# Patient Record
Sex: FEMALE | Race: WHITE | NOT HISPANIC OR LATINO
[De-identification: names, ages, dates, MRNs, and addresses within clinical notes are randomized per-mention and may not be internally consistent; named-entity substitution may affect disease eponyms.]

---

## 2017-08-03 ENCOUNTER — APPOINTMENT (OUTPATIENT)
Dept: SURGICAL ONCOLOGY | Facility: CLINIC | Age: 82
End: 2017-08-03

## 2017-09-21 ENCOUNTER — APPOINTMENT (OUTPATIENT)
Dept: SURGICAL ONCOLOGY | Facility: CLINIC | Age: 82
End: 2017-09-21
Payer: MEDICARE

## 2017-09-21 VITALS
HEIGHT: 60 IN | SYSTOLIC BLOOD PRESSURE: 147 MMHG | OXYGEN SATURATION: 97 % | WEIGHT: 165 LBS | DIASTOLIC BLOOD PRESSURE: 69 MMHG | TEMPERATURE: 98.8 F | HEART RATE: 85 BPM | BODY MASS INDEX: 32.39 KG/M2

## 2017-09-21 DIAGNOSIS — J44.9 CHRONIC OBSTRUCTIVE PULMONARY DISEASE, UNSPECIFIED: ICD-10-CM

## 2017-09-21 DIAGNOSIS — Z87.39 PERSONAL HISTORY OF OTHER DISEASES OF THE MUSCULOSKELETAL SYSTEM AND CONNECTIVE TISSUE: ICD-10-CM

## 2017-09-21 DIAGNOSIS — Z85.9 PERSONAL HISTORY OF MALIGNANT NEOPLASM, UNSPECIFIED: ICD-10-CM

## 2017-09-21 DIAGNOSIS — Z87.891 PERSONAL HISTORY OF NICOTINE DEPENDENCE: ICD-10-CM

## 2017-09-21 DIAGNOSIS — M48.06 SPINAL STENOSIS, LUMBAR REGION: ICD-10-CM

## 2017-09-21 DIAGNOSIS — Z82.61 FAMILY HISTORY OF ARTHRITIS: ICD-10-CM

## 2017-09-21 DIAGNOSIS — Z78.9 OTHER SPECIFIED HEALTH STATUS: ICD-10-CM

## 2017-09-21 DIAGNOSIS — Z87.09 PERSONAL HISTORY OF OTHER DISEASES OF THE RESPIRATORY SYSTEM: ICD-10-CM

## 2017-09-21 DIAGNOSIS — Z82.62 FAMILY HISTORY OF OSTEOPOROSIS: ICD-10-CM

## 2017-09-21 PROCEDURE — 99204 OFFICE O/P NEW MOD 45 MIN: CPT

## 2018-02-12 ENCOUNTER — APPOINTMENT (OUTPATIENT)
Dept: GERIATRICS | Facility: CLINIC | Age: 83
End: 2018-02-12
Payer: MEDICARE

## 2018-02-12 VITALS
HEART RATE: 104 BPM | HEIGHT: 60 IN | BODY MASS INDEX: 26.9 KG/M2 | WEIGHT: 137 LBS | SYSTOLIC BLOOD PRESSURE: 98 MMHG | DIASTOLIC BLOOD PRESSURE: 62 MMHG | OXYGEN SATURATION: 98 %

## 2018-02-12 DIAGNOSIS — Z60.2 PROBLEMS RELATED TO LIVING ALONE: ICD-10-CM

## 2018-02-12 LAB
BASOPHILS # BLD AUTO: 0.05 K/UL
BASOPHILS NFR BLD AUTO: 0.6 %
EOSINOPHIL # BLD AUTO: 0.67 K/UL
EOSINOPHIL NFR BLD AUTO: 8 %
HCT VFR BLD CALC: 40.1 %
HGB BLD-MCNC: 12.9 G/DL
IMM GRANULOCYTES NFR BLD AUTO: 0.2 %
LYMPHOCYTES # BLD AUTO: 1.24 K/UL
LYMPHOCYTES NFR BLD AUTO: 14.8 %
MAN DIFF?: NORMAL
MCHC RBC-ENTMCNC: 29.9 PG
MCHC RBC-ENTMCNC: 32.2 GM/DL
MCV RBC AUTO: 93 FL
MONOCYTES # BLD AUTO: 0.67 K/UL
MONOCYTES NFR BLD AUTO: 8 %
NEUTROPHILS # BLD AUTO: 5.72 K/UL
NEUTROPHILS NFR BLD AUTO: 68.4 %
PLATELET # BLD AUTO: 354 K/UL
RBC # BLD: 4.31 M/UL
RBC # FLD: 15.3 %
WBC # FLD AUTO: 8.37 K/UL

## 2018-02-12 PROCEDURE — 99205 OFFICE O/P NEW HI 60 MIN: CPT | Mod: GC

## 2018-02-12 RX ORDER — BUDESONIDE 0.5 MG/2ML
0.5 INHALANT ORAL TWICE DAILY
Refills: 0 | Status: ACTIVE | COMMUNITY
Start: 2018-02-12

## 2018-02-12 RX ORDER — ACETAMINOPHEN 325 MG/1
TABLET, FILM COATED ORAL
Refills: 0 | Status: ACTIVE | COMMUNITY

## 2018-02-12 RX ORDER — ALBUTEROL SULFATE 2.5 MG/3ML
(2.5 MG/3ML) SOLUTION RESPIRATORY (INHALATION)
Refills: 0 | Status: ACTIVE | COMMUNITY

## 2018-02-12 RX ORDER — QUETIAPINE FUMARATE 25 MG/1
25 TABLET ORAL
Qty: 90 | Refills: 0 | Status: ACTIVE | COMMUNITY
Start: 2017-09-28

## 2018-02-12 SDOH — SOCIAL STABILITY - SOCIAL INSECURITY: PROBLEMS RELATED TO LIVING ALONE: Z60.2

## 2018-02-13 LAB
24R-OH-CALCIDIOL SERPL-MCNC: 40.4 PG/ML
ALBUMIN SERPL ELPH-MCNC: 3.8 G/DL
ALP BLD-CCNC: 87 U/L
ALT SERPL-CCNC: 11 U/L
ANION GAP SERPL CALC-SCNC: 16 MMOL/L
AST SERPL-CCNC: 19 U/L
BILIRUB SERPL-MCNC: 0.4 MG/DL
BUN SERPL-MCNC: 15 MG/DL
CALCIUM SERPL-MCNC: 9.5 MG/DL
CHLORIDE SERPL-SCNC: 101 MMOL/L
CO2 SERPL-SCNC: 24 MMOL/L
CREAT SERPL-MCNC: 0.83 MG/DL
GLUCOSE SERPL-MCNC: 106 MG/DL
POTASSIUM SERPL-SCNC: 4.1 MMOL/L
PROT SERPL-MCNC: 6.5 G/DL
SODIUM SERPL-SCNC: 141 MMOL/L

## 2018-03-08 ENCOUNTER — OTHER (OUTPATIENT)
Age: 83
End: 2018-03-08

## 2018-03-16 ENCOUNTER — MESSAGE (OUTPATIENT)
Age: 83
End: 2018-03-16

## 2018-04-06 RX ORDER — DONEPEZIL HYDROCHLORIDE 10 MG/1
10 TABLET ORAL DAILY
Qty: 90 | Refills: 1 | Status: ACTIVE | COMMUNITY
Start: 2018-02-12 | End: 1900-01-01

## 2018-04-06 RX ORDER — SERTRALINE HYDROCHLORIDE 50 MG/1
50 TABLET, FILM COATED ORAL
Qty: 90 | Refills: 1 | Status: ACTIVE | COMMUNITY
Start: 2018-04-06 | End: 1900-01-01

## 2018-04-14 ENCOUNTER — EMERGENCY (EMERGENCY)
Facility: HOSPITAL | Age: 83
LOS: 1 days | Discharge: HOME | End: 2018-04-14
Attending: EMERGENCY MEDICINE
Payer: MEDICARE

## 2018-04-14 VITALS
SYSTOLIC BLOOD PRESSURE: 98 MMHG | OXYGEN SATURATION: 99 % | DIASTOLIC BLOOD PRESSURE: 57 MMHG | HEART RATE: 72 BPM | RESPIRATION RATE: 20 BRPM

## 2018-04-14 LAB
ALBUMIN SERPL ELPH-MCNC: 3.7 G/DL — SIGNIFICANT CHANGE UP (ref 3.3–5)
ALP SERPL-CCNC: 65 U/L — SIGNIFICANT CHANGE UP (ref 40–120)
ALT FLD-CCNC: 24 U/L RC — SIGNIFICANT CHANGE UP (ref 10–45)
ANION GAP SERPL CALC-SCNC: 13 MMOL/L — SIGNIFICANT CHANGE UP (ref 5–17)
APPEARANCE UR: CLEAR — SIGNIFICANT CHANGE UP
AST SERPL-CCNC: 18 U/L — SIGNIFICANT CHANGE UP (ref 10–40)
BASOPHILS # BLD AUTO: 0 K/UL — SIGNIFICANT CHANGE UP (ref 0–0.2)
BASOPHILS NFR BLD AUTO: 0.2 % — SIGNIFICANT CHANGE UP (ref 0–2)
BILIRUB SERPL-MCNC: 0.3 MG/DL — SIGNIFICANT CHANGE UP (ref 0.2–1.2)
BILIRUB UR-MCNC: NEGATIVE — SIGNIFICANT CHANGE UP
BUN SERPL-MCNC: 25 MG/DL — HIGH (ref 7–23)
CALCIUM SERPL-MCNC: 8.8 MG/DL — SIGNIFICANT CHANGE UP (ref 8.4–10.5)
CHLORIDE SERPL-SCNC: 106 MMOL/L — SIGNIFICANT CHANGE UP (ref 96–108)
CK MB CFR SERPL CALC: 1.4 NG/ML — SIGNIFICANT CHANGE UP (ref 0–3.8)
CO2 SERPL-SCNC: 22 MMOL/L — SIGNIFICANT CHANGE UP (ref 22–31)
COLOR SPEC: YELLOW — SIGNIFICANT CHANGE UP
CREAT SERPL-MCNC: 1.05 MG/DL — SIGNIFICANT CHANGE UP (ref 0.5–1.3)
DIFF PNL FLD: NEGATIVE — SIGNIFICANT CHANGE UP
EOSINOPHIL # BLD AUTO: 0.2 K/UL — SIGNIFICANT CHANGE UP (ref 0–0.5)
EOSINOPHIL NFR BLD AUTO: 1.9 % — SIGNIFICANT CHANGE UP (ref 0–6)
GAS PNL BLDV: SIGNIFICANT CHANGE UP
GLUCOSE SERPL-MCNC: 132 MG/DL — HIGH (ref 70–99)
GLUCOSE UR QL: NEGATIVE — SIGNIFICANT CHANGE UP
HCT VFR BLD CALC: 37.2 % — SIGNIFICANT CHANGE UP (ref 34.5–45)
HGB BLD-MCNC: 12.3 G/DL — SIGNIFICANT CHANGE UP (ref 11.5–15.5)
HYALINE CASTS # UR AUTO: ABNORMAL
KETONES UR-MCNC: ABNORMAL
LEUKOCYTE ESTERASE UR-ACNC: ABNORMAL
LYMPHOCYTES # BLD AUTO: 1.1 K/UL — SIGNIFICANT CHANGE UP (ref 1–3.3)
LYMPHOCYTES # BLD AUTO: 13.3 % — SIGNIFICANT CHANGE UP (ref 13–44)
MCHC RBC-ENTMCNC: 31.2 PG — SIGNIFICANT CHANGE UP (ref 27–34)
MCHC RBC-ENTMCNC: 33.1 GM/DL — SIGNIFICANT CHANGE UP (ref 32–36)
MCV RBC AUTO: 94.3 FL — SIGNIFICANT CHANGE UP (ref 80–100)
MONOCYTES # BLD AUTO: 0.8 K/UL — SIGNIFICANT CHANGE UP (ref 0–0.9)
MONOCYTES NFR BLD AUTO: 10.1 % — SIGNIFICANT CHANGE UP (ref 2–14)
NEUTROPHILS # BLD AUTO: 6.1 K/UL — SIGNIFICANT CHANGE UP (ref 1.8–7.4)
NEUTROPHILS NFR BLD AUTO: 74.5 % — SIGNIFICANT CHANGE UP (ref 43–77)
NITRITE UR-MCNC: NEGATIVE — SIGNIFICANT CHANGE UP
PH UR: 6 — SIGNIFICANT CHANGE UP (ref 5–8)
PLATELET # BLD AUTO: 249 K/UL — SIGNIFICANT CHANGE UP (ref 150–400)
POTASSIUM SERPL-MCNC: 4.2 MMOL/L — SIGNIFICANT CHANGE UP (ref 3.5–5.3)
POTASSIUM SERPL-SCNC: 4.2 MMOL/L — SIGNIFICANT CHANGE UP (ref 3.5–5.3)
PROT SERPL-MCNC: 5.9 G/DL — LOW (ref 6–8.3)
PROT UR-MCNC: 30 MG/DL
RBC # BLD: 3.94 M/UL — SIGNIFICANT CHANGE UP (ref 3.8–5.2)
RBC # FLD: 14.4 % — SIGNIFICANT CHANGE UP (ref 10.3–14.5)
RBC CASTS # UR COMP ASSIST: SIGNIFICANT CHANGE UP /HPF (ref 0–2)
SODIUM SERPL-SCNC: 141 MMOL/L — SIGNIFICANT CHANGE UP (ref 135–145)
SP GR SPEC: >1.03 — HIGH (ref 1.01–1.02)
TROPONIN T SERPL-MCNC: <0.01 NG/ML — SIGNIFICANT CHANGE UP (ref 0–0.06)
UROBILINOGEN FLD QL: 1 MG/DL
WBC # BLD: 8.2 K/UL — SIGNIFICANT CHANGE UP (ref 3.8–10.5)
WBC # FLD AUTO: 8.2 K/UL — SIGNIFICANT CHANGE UP (ref 3.8–10.5)
WBC UR QL: SIGNIFICANT CHANGE UP /HPF (ref 0–5)

## 2018-04-14 PROCEDURE — 99285 EMERGENCY DEPT VISIT HI MDM: CPT | Mod: 25,GC

## 2018-04-14 PROCEDURE — 71045 X-RAY EXAM CHEST 1 VIEW: CPT | Mod: 26

## 2018-04-14 PROCEDURE — 93010 ELECTROCARDIOGRAM REPORT: CPT

## 2018-04-14 NOTE — ED PROVIDER NOTE - PSH
Bladder Prolapse, Acquired  Hx of bladder lift 38 years ago  Breast cyst  excision of left breast cyst : benign  Cataract  removal of bilateral cataract

## 2018-04-14 NOTE — ED PROVIDER NOTE - MEDICAL DECISION MAKING DETAILS
Possible syncope; plan: cbc, cmp, trop, ua, ecg. Possible syncope; plan: cbc, cmp, trop, ua, ecg.  Eric RICHARDSON: 89yo F with h/o asthma, depression, emphysema, cad, dementia p/w syncopal episode - likely vasovagal.  Plan to rule-out cardiac etiology with lab work, troponin and repeat EKG.

## 2018-04-14 NOTE — ED PROVIDER NOTE - PROGRESS NOTE DETAILS
Attending MD Fournier.  Pt signed out to me in stable condition pending Rpt trop => DC - CAD, dementia, episode of syncope, vomited once, well appearing, no repeat episodes, offered admission family decided to take pt home. Offered admission, patient's family wanting to take her home if ECG and labs unremarkable; understand limits of ED evaluation, and unlikely benefit to admission given age, comorbidities.  If repeat trop negative, can be discharged.  Patient's presentation, exam, results and plan of care endorsed to oncoming physician.  -Austen Attending MD Fournier.  Repeat troponin non-actionable. Family continues to prefer discharge.  Stable for discharge with family currently, follow-up with PCP/cardiology as outpt as desired.  Return to ED for any acutely new/worsening sxs including fevers, chills, worsened mental status or pain complaints.

## 2018-04-14 NOTE — ED PROVIDER NOTE - PLAN OF CARE
Follow-up with your Primary Care Physician within 24-48 hours.  Please return to the Emergency Department immediately for any new, worsening or concerning symptoms; specifically those included in the attached information brochure.  Copy of your lab work, imaging and/or other testing performed in the ER is attached along with your discharge paperwork.  Please take this to your Primary Care Physician for further discussion, evaluation and comparison with your prior blood work results.     You were seen in the ER for lightheadedness/syncope.  Please follow-up with your Primary Care Doctor and a Cardiologist for further evaluation.  If you are unable to see an Cardiologist through your Primary Care Doctor – you may contact our clinic at [phone].  You may also choose to call [phone] to discuss physicians in the area that may take your insurance.     Please return to the ER if you begin to have chest pain, shortness of breath,, lightheadedness/syncope and/or any other signs or symptoms you find concerning.

## 2018-04-14 NOTE — ED PROVIDER NOTE - PMH
Asthma    Cataract  bilateral eyes  Celiac disease  Pt reports she was Dx with Celiac by History and symptoms, no biopsy was done,.  Sympotms improved with diet change.  Depression  symptopms well controlled with Zoloft  Emphysema    Pancreatitis  2010

## 2018-04-14 NOTE — ED ADULT NURSE NOTE - OBJECTIVE STATEMENT
89 yo presents to the ED from home. A&Ox2 s/p syncopal episode. aide reports that pt was at home, ambulated to the bathroom with assistance when she began to feel nauseas. aide reports that pt sat on toilet and passed out for a few seconds. pt denies fall. pt reports slight HA currently. pt denies fever, chills, urinary symptoms. pt denies CP, pt is well appearing. PMH of advanced stage dementia, breast CA, COPD, not on O2 at home. according to EMS, FS was 176. EMS placed 20G in L forearm. family and aide at bedside. VSS.

## 2018-04-14 NOTE — ED PROVIDER NOTE - OBJECTIVE STATEMENT
89yo F with h/o asthma, depression, emphysema, cad, dementia, presenting with episode of loss of consciousness / syncope, no vomiting, no fever, no apparent pain. 89yo F with h/o asthma, depression, emphysema, cad, dementia, presenting with episode of loss of consciousness / syncope, no vomiting, no fever, no apparent pain.    Eric RICHARDSON: Family report that patient felt like she may vomit - escorted to the bathroom where she had a few episodes of retching where she started to shake.  Patient then had loss of consciousness for approx. 2 minutes thereafter - no bowel/bladder incontinence or tongue biting.  No prior episodes.  No recent fevers, CP, SOB, abdominal pain.

## 2018-04-14 NOTE — ED PROVIDER NOTE - ATTENDING CONTRIBUTION TO CARE
89yo F with h/o asthma, depression, emphysema, cad, dementia, presenting with episode of loss of consciousness / syncope, no vomiting, no fever, no apparent pain.    Possible syncope; plan: cbc, cmp, trop, ua, ecg.     Ronaldo

## 2018-04-14 NOTE — ED ADULT NURSE REASSESSMENT NOTE - NS ED NURSE REASSESS COMMENT FT1
pt family refused straight cath. 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 MD Reyes made aware.

## 2018-04-14 NOTE — ED PROVIDER NOTE - CARE PLAN
Principal Discharge DX:	Syncope  Assessment and plan of treatment:	Follow-up with your Primary Care Physician within 24-48 hours.  Please return to the Emergency Department immediately for any new, worsening or concerning symptoms; specifically those included in the attached information brochure.  Copy of your lab work, imaging and/or other testing performed in the ER is attached along with your discharge paperwork.  Please take this to your Primary Care Physician for further discussion, evaluation and comparison with your prior blood work results.     You were seen in the ER for lightheadedness/syncope.  Please follow-up with your Primary Care Doctor and a Cardiologist for further evaluation.  If you are unable to see an Cardiologist through your Primary Care Doctor – you may contact our clinic at [phone].  You may also choose to call [phone] to discuss physicians in the area that may take your insurance.     Please return to the ER if you begin to have chest pain, shortness of breath,, lightheadedness/syncope and/or any other signs or symptoms you find concerning.

## 2018-04-15 VITALS
DIASTOLIC BLOOD PRESSURE: 60 MMHG | OXYGEN SATURATION: 98 % | TEMPERATURE: 98 F | RESPIRATION RATE: 16 BRPM | SYSTOLIC BLOOD PRESSURE: 147 MMHG | HEART RATE: 83 BPM

## 2018-04-15 LAB — TROPONIN T SERPL-MCNC: <0.01 NG/ML — SIGNIFICANT CHANGE UP (ref 0–0.06)

## 2018-04-15 PROCEDURE — 85027 COMPLETE CBC AUTOMATED: CPT

## 2018-04-15 PROCEDURE — 84132 ASSAY OF SERUM POTASSIUM: CPT

## 2018-04-15 PROCEDURE — 93005 ELECTROCARDIOGRAM TRACING: CPT

## 2018-04-15 PROCEDURE — 85014 HEMATOCRIT: CPT

## 2018-04-15 PROCEDURE — 82803 BLOOD GASES ANY COMBINATION: CPT

## 2018-04-15 PROCEDURE — 82553 CREATINE MB FRACTION: CPT

## 2018-04-15 PROCEDURE — 82947 ASSAY GLUCOSE BLOOD QUANT: CPT

## 2018-04-15 PROCEDURE — 84295 ASSAY OF SERUM SODIUM: CPT

## 2018-04-15 PROCEDURE — 99283 EMERGENCY DEPT VISIT LOW MDM: CPT

## 2018-04-15 PROCEDURE — 80053 COMPREHEN METABOLIC PANEL: CPT

## 2018-04-15 PROCEDURE — 83605 ASSAY OF LACTIC ACID: CPT

## 2018-04-15 PROCEDURE — 81001 URINALYSIS AUTO W/SCOPE: CPT

## 2018-04-15 PROCEDURE — 84484 ASSAY OF TROPONIN QUANT: CPT

## 2018-04-15 PROCEDURE — 83690 ASSAY OF LIPASE: CPT

## 2018-04-15 PROCEDURE — 71045 X-RAY EXAM CHEST 1 VIEW: CPT

## 2018-04-15 PROCEDURE — 82435 ASSAY OF BLOOD CHLORIDE: CPT

## 2018-04-15 PROCEDURE — 82330 ASSAY OF CALCIUM: CPT

## 2018-04-16 ENCOUNTER — RX RENEWAL (OUTPATIENT)
Age: 83
End: 2018-04-16

## 2018-04-16 RX ORDER — AMLODIPINE BESYLATE 10 MG/1
10 TABLET ORAL
Qty: 30 | Refills: 1 | Status: ACTIVE | COMMUNITY
Start: 2018-04-16 | End: 1900-01-01

## 2018-05-10 ENCOUNTER — MESSAGE (OUTPATIENT)
Age: 83
End: 2018-05-10

## 2018-05-14 ENCOUNTER — APPOINTMENT (OUTPATIENT)
Dept: GERIATRICS | Facility: CLINIC | Age: 83
End: 2018-05-14
Payer: MEDICARE

## 2018-05-14 VITALS
BODY MASS INDEX: 27.17 KG/M2 | WEIGHT: 138.4 LBS | OXYGEN SATURATION: 91 % | HEIGHT: 60 IN | HEART RATE: 72 BPM | RESPIRATION RATE: 18 BRPM | DIASTOLIC BLOOD PRESSURE: 70 MMHG | SYSTOLIC BLOOD PRESSURE: 120 MMHG

## 2018-05-14 DIAGNOSIS — J44.9 CHRONIC OBSTRUCTIVE PULMONARY DISEASE, UNSPECIFIED: ICD-10-CM

## 2018-05-14 DIAGNOSIS — C64.9 MALIGNANT NEOPLASM OF UNSPECIFIED KIDNEY, EXCEPT RENAL PELVIS: ICD-10-CM

## 2018-05-14 DIAGNOSIS — M17.12 UNILATERAL PRIMARY OSTEOARTHRITIS, LEFT KNEE: ICD-10-CM

## 2018-05-14 DIAGNOSIS — D05.11 INTRADUCTAL CARCINOMA IN SITU OF RIGHT BREAST: ICD-10-CM

## 2018-05-14 DIAGNOSIS — F03.C0 UNSPECIFIED DEMENTIA, SEVERE, WITHOUT BEHAVIORAL DISTURBANCE, PSYCHOTIC DISTURBANCE, MOOD DISTURBANCE, AND ANXIETY: ICD-10-CM

## 2018-05-14 PROCEDURE — 99215 OFFICE O/P EST HI 40 MIN: CPT | Mod: GC

## 2018-07-25 PROBLEM — Z78.9 ALCOHOL USE: Status: ACTIVE | Noted: 2017-09-21

## 2022-12-27 NOTE — ED ADULT NURSE NOTE - NS ED NURSE IV DC DT
15-Apr-2018 02:46

## 2023-04-05 PROBLEM — F03.C0 ADVANCED DEMENTIA: Status: ACTIVE | Noted: 2018-02-12
